# Patient Record
Sex: FEMALE
[De-identification: names, ages, dates, MRNs, and addresses within clinical notes are randomized per-mention and may not be internally consistent; named-entity substitution may affect disease eponyms.]

---

## 2022-02-15 PROBLEM — Z00.00 ENCOUNTER FOR PREVENTIVE HEALTH EXAMINATION: Status: ACTIVE | Noted: 2022-02-15

## 2022-03-29 ENCOUNTER — APPOINTMENT (OUTPATIENT)
Dept: NEUROLOGY | Facility: AMBULATORY SURGERY CENTER | Age: 22
End: 2022-03-29
Payer: COMMERCIAL

## 2022-03-29 ENCOUNTER — TRANSCRIPTION ENCOUNTER (OUTPATIENT)
Age: 22
End: 2022-03-29

## 2022-03-29 VITALS
HEIGHT: 61 IN | OXYGEN SATURATION: 100 % | HEART RATE: 75 BPM | DIASTOLIC BLOOD PRESSURE: 64 MMHG | WEIGHT: 98.01 LBS | SYSTOLIC BLOOD PRESSURE: 95 MMHG | BODY MASS INDEX: 18.51 KG/M2 | TEMPERATURE: 97.5 F

## 2022-03-29 DIAGNOSIS — Z78.9 OTHER SPECIFIED HEALTH STATUS: ICD-10-CM

## 2022-03-29 PROCEDURE — 99214 OFFICE O/P EST MOD 30 MIN: CPT

## 2022-03-29 RX ORDER — FEXOFENADINE HCL 60 MG
60 CAPSULE ORAL
Refills: 0 | Status: ACTIVE | COMMUNITY

## 2022-03-29 RX ORDER — LEVOTHYROXINE SODIUM 25 UG/1
25 TABLET ORAL DAILY
Refills: 0 | Status: ACTIVE | COMMUNITY

## 2022-03-29 RX ORDER — FAMOTIDINE 10 MG/1
10 TABLET, FILM COATED ORAL TWICE DAILY
Refills: 0 | Status: ACTIVE | COMMUNITY

## 2022-03-29 NOTE — PHYSICAL EXAM
[FreeTextEntry1] : General: this is a pleasant patient in no acute distress\par \par HEENT conjunctiva are normal, no tenderness in head\par \par CV: normal pulses, regular rate and rhythm, no peripheral edema noted\par \par Lungs: breathing is non-labored\par \par abd: soft and non-distended\par \par MSK:\par SLR: \par GRACIE:\par range of motion: full\par trigger points in traps\par tinnels: \par spurling:\par Occipital nerve tenderness: denies b/l\par \par Mental status:\par Alert and oriented to person, place and time, normal speech and comprehension\par \par Cranial Nerves:\par extra-occular movements in tact without nystagmus, normal saccades and smooth pursuit, Face symmetric and facial strength symmetric, facial sensation symmetric, \par \par Motor: normal bulk and tone throughout. no abnormal movements.  Full 5/5 strength uppers and lower extremities proximally and distally\par \par Sensory: in tact and symmetric to vibration, light tough, temperature\par \par Cerebellar: normal finger-nose-finger bilaterally\par \par Reflexes: 2+ in the upper and lower extremities and symmetric.  toes are bilaterally downgoing.\par \par Gait: stable, able to tip toe heel and tandem\par \par Stances:\par Romberg: normal\par \par \par

## 2022-03-29 NOTE — CONSULT LETTER
[Dear  ___] : Dear  [unfilled], [Courtesy Letter:] : I had the pleasure of seeing your patient, [unfilled], in my office today. [Please see my note below.] : Please see my note below. [Sincerely,] : Sincerely, [FreeTextEntry3] : Hong Partida

## 2022-03-29 NOTE — HISTORY OF PRESENT ILLNESS
[FreeTextEntry1] : Former [facility] patient here for f/u.  Last visit at [facility] was 2/15/2022 via video\par \par AVELINA AMBRIZ is a 21 year who presents with PTS, possible mild IIH, concussion, chronic migraine\par \par last visit refilled diamox 125mg TID, proranolol 5mg BID, florinef 0.05mg daily.  at that time was having more allergic issues\par \par after that she saw the allergist, allergic to many things, now on low histamine diet.  has lost 10lbs.  significantly less migraines since then. also on antihistamines.  apparently histamine still high despite diet change.\par \par was feeling very sleepy to weaned off diamox for the last week and doesn't feel worse.  still feels more fatigued.\par \par

## 2022-06-16 ENCOUNTER — APPOINTMENT (OUTPATIENT)
Dept: NEUROLOGY | Facility: AMBULATORY SURGERY CENTER | Age: 22
End: 2022-06-16
Payer: COMMERCIAL

## 2022-06-16 VITALS
SYSTOLIC BLOOD PRESSURE: 93 MMHG | TEMPERATURE: 98.1 F | DIASTOLIC BLOOD PRESSURE: 61 MMHG | HEIGHT: 61 IN | WEIGHT: 105 LBS | BODY MASS INDEX: 19.83 KG/M2

## 2022-06-16 VITALS — DIASTOLIC BLOOD PRESSURE: 65 MMHG | SYSTOLIC BLOOD PRESSURE: 91 MMHG | HEART RATE: 86 BPM | OXYGEN SATURATION: 100 %

## 2022-06-16 VITALS — HEART RATE: 73 BPM | OXYGEN SATURATION: 100 % | DIASTOLIC BLOOD PRESSURE: 64 MMHG | SYSTOLIC BLOOD PRESSURE: 96 MMHG

## 2022-06-16 DIAGNOSIS — G43.719 CHRONIC MIGRAINE W/OUT AURA, INTRACTABLE, W/OUT STATUS MIGRAINOSUS: ICD-10-CM

## 2022-06-16 DIAGNOSIS — G93.2 BENIGN INTRACRANIAL HYPERTENSION: ICD-10-CM

## 2022-06-16 PROCEDURE — 99214 OFFICE O/P EST MOD 30 MIN: CPT

## 2022-06-16 RX ORDER — FLUDROCORTISONE ACETATE 0.1 MG/1
0.1 TABLET ORAL DAILY
Qty: 15 | Refills: 3 | Status: DISCONTINUED | COMMUNITY
Start: 2022-03-29 | End: 2022-06-16

## 2022-06-16 RX ORDER — FLUDROCORTISONE ACETATE 0.1 MG/1
0.1 TABLET ORAL
Refills: 0 | Status: DISCONTINUED | COMMUNITY
End: 2022-06-16

## 2022-06-16 RX ORDER — EPINEPHRINE 0.3 MG/.3ML
0.3 INJECTION INTRAMUSCULAR
Qty: 2 | Refills: 0 | Status: ACTIVE | COMMUNITY
Start: 2022-03-05

## 2022-06-16 RX ORDER — PROPRANOLOL HYDROCHLORIDE 10 MG/1
10 TABLET ORAL TWICE DAILY
Refills: 0 | Status: DISCONTINUED | COMMUNITY
End: 2022-06-16

## 2022-06-16 RX ORDER — PROPRANOLOL HYDROCHLORIDE 10 MG/1
10 TABLET ORAL TWICE DAILY
Qty: 30 | Refills: 3 | Status: DISCONTINUED | COMMUNITY
Start: 2022-03-29 | End: 2022-06-16

## 2022-06-16 RX ORDER — AZELASTINE HYDROCHLORIDE 137 UG/1
0.1 SPRAY, METERED NASAL
Qty: 30 | Refills: 0 | Status: ACTIVE | COMMUNITY
Start: 2022-04-26

## 2022-06-16 NOTE — PHYSICAL EXAM
[FreeTextEntry1] : General: this is a pleasant patient in no acute distress\par \par HEENT conjunctiva are normal, no tenderness in head\par \par CV: normal pulses, regular rate and rhythm, no peripheral edema noted\par \par Lungs: breathing is non-labored\par \par abd: soft and non-distended\par \par MSK:\par SLR: \par GRACIE:\par range of motion: full\par trigger points in traps\par tinnels: \par spurling:\par Occipital nerve tenderness: denies b/l\par \par Mental status:\par Alert and oriented to person, place and time, normal speech and comprehension\par \par Cranial Nerves:\par extra-occular movements in tact without nystagmus, normal saccades and smooth pursuit, Face symmetric and facial strength symmetric, facial sensation symmetric, NPC 10cm\par \par Motor: normal bulk and tone throughout. no abnormal movements.  Full 5/5 strength uppers and lower extremities proximally and distally\par \par Sensory: in tact and symmetric to vibration, light tough, temperature\par \par Cerebellar: normal finger-nose-finger bilaterally\par \par Gait: stable, able to tip toe heel and tandem\par \par Stances:\par Romberg: normal\par \par \par

## 2022-06-16 NOTE — HISTORY OF PRESENT ILLNESS
[FreeTextEntry1] : AVELINA AMBRIZ is a 22 year who returns to follow up for POTS, possible IIH, concussion, chronic migraine\par \par last visit was 3/29/2022\par \par she was doing well off diamox and we planned to stop propranlol then fludrocortisone \par \par since last visit she did stop those medications\par \par started a digestive enzyme and also on several antihistamines. no migraines. completed PT and does do home exercises.  does 5-8lb weight training 5 times weekly and also mixes in some exercise bike\par \par still feels like there is decreased stamina and brain fog.  not specific to certain activities.\par \par had COVID end of May and brain did feel clear but had fevers.\par \par \par

## 2022-12-05 ENCOUNTER — APPOINTMENT (OUTPATIENT)
Age: 22
End: 2022-12-05

## 2022-12-05 VITALS
OXYGEN SATURATION: 99 % | HEART RATE: 72 BPM | HEIGHT: 61 IN | TEMPERATURE: 98.2 F | SYSTOLIC BLOOD PRESSURE: 105 MMHG | BODY MASS INDEX: 20.96 KG/M2 | DIASTOLIC BLOOD PRESSURE: 69 MMHG | WEIGHT: 111 LBS

## 2022-12-05 PROCEDURE — 99214 OFFICE O/P EST MOD 30 MIN: CPT

## 2022-12-05 RX ORDER — ONDANSETRON 4 MG/1
4 TABLET, ORALLY DISINTEGRATING ORAL
Qty: 10 | Refills: 0 | Status: DISCONTINUED | COMMUNITY
Start: 2022-03-05 | End: 2022-12-05

## 2022-12-05 RX ORDER — DAPSONE 50 MG/G
5 GEL TOPICAL
Qty: 60 | Refills: 0 | Status: DISCONTINUED | COMMUNITY
Start: 2022-01-10 | End: 2022-12-05

## 2022-12-05 RX ORDER — ADAPALENE 1 MG/G
0.1 GEL TOPICAL
Qty: 45 | Refills: 0 | Status: DISCONTINUED | COMMUNITY
Start: 2022-06-14 | End: 2022-12-05

## 2022-12-05 NOTE — ASSESSMENT
[FreeTextEntry1] : we had an extensive discussion about my experience with combination of concussion and COVID and MCAS, several questions answered

## 2022-12-05 NOTE — HISTORY OF PRESENT ILLNESS
[FreeTextEntry1] : AVELINA AMBRIZ is a 22 year who returns to follow up for concussion\par \par last visit was 6/16/2022 when she was doing quite well other than persistent fatigue\par \par since last visit had a great early summer.  had COVID in June.  then had tons of allergies with the fall like she's never had. foods, environment etc. allergist says it is MCAS.\par \par did have one really bad week of neck pain.  did streches and exercises until it was better.  tried voltaren that didn't help. \par \par now weeks are up and down.\par \par

## 2023-03-21 ENCOUNTER — TRANSCRIPTION ENCOUNTER (OUTPATIENT)
Age: 23
End: 2023-03-21

## 2023-03-27 ENCOUNTER — TRANSCRIPTION ENCOUNTER (OUTPATIENT)
Age: 23
End: 2023-03-27

## 2023-03-27 ENCOUNTER — NON-APPOINTMENT (OUTPATIENT)
Age: 23
End: 2023-03-27

## 2023-04-26 ENCOUNTER — APPOINTMENT (OUTPATIENT)
Age: 23
End: 2023-04-26
Payer: COMMERCIAL

## 2023-04-26 VITALS
OXYGEN SATURATION: 100 % | WEIGHT: 112 LBS | SYSTOLIC BLOOD PRESSURE: 108 MMHG | HEART RATE: 85 BPM | BODY MASS INDEX: 21.14 KG/M2 | DIASTOLIC BLOOD PRESSURE: 68 MMHG | HEIGHT: 61 IN | TEMPERATURE: 98.3 F

## 2023-04-26 PROCEDURE — 99214 OFFICE O/P EST MOD 30 MIN: CPT

## 2023-04-26 RX ORDER — SUMATRIPTAN 25 MG/1
25 TABLET, FILM COATED ORAL
Qty: 9 | Refills: 0 | Status: DISCONTINUED | COMMUNITY
Start: 2022-01-04 | End: 2023-04-26

## 2023-04-26 NOTE — CONSULT LETTER
[Dear  ___] : Dear  [unfilled], [Courtesy Letter:] : I had the pleasure of seeing your patient, [unfilled], in my office today. [Please see my note below.] : Please see my note below. [Consult Closing:] : Thank you very much for allowing me to participate in the care of this patient.  If you have any questions, please do not hesitate to contact me. [Sincerely,] : Sincerely, [FreeTextEntry3] : Hong Partida MD

## 2023-04-26 NOTE — HISTORY OF PRESENT ILLNESS
[FreeTextEntry1] : AVELINA AMBRIZ is a 23 year who returns to follow up for mTBI/concussion\par \par last visit was 12/6/2022 when I provided counseling and medications were not indicated\par \par since last visit was able to go skiing with family which she hasn't done in 5 years.  over passover she again started having a lot of allergic reactions, strong reactions to smells, was more agitated. \par \par her allergist retired, now seeing Dr Galvan at [place]\par \par has some tachycardia in mornings but not during the day.  not taking either fludrocortisone or propranolol\par \par no recent migraines. mild headaches with sleep deprivation.\par \par still feels foggy and fatigue and brain fog.\par \par needs a lot of sleep 12-9:30, doesn't always feel rested.  did previously see sleep at [facility] and sleep psychologist there but hasn't changed much\par \par \par

## 2023-08-01 ENCOUNTER — APPOINTMENT (OUTPATIENT)
Dept: PULMONOLOGY | Facility: CLINIC | Age: 23
End: 2023-08-01
Payer: COMMERCIAL

## 2023-08-01 VITALS
OXYGEN SATURATION: 99 % | BODY MASS INDEX: 20.96 KG/M2 | HEIGHT: 61 IN | HEART RATE: 75 BPM | SYSTOLIC BLOOD PRESSURE: 88 MMHG | TEMPERATURE: 98 F | DIASTOLIC BLOOD PRESSURE: 55 MMHG | WEIGHT: 111 LBS

## 2023-08-01 DIAGNOSIS — G47.11 IDIOPATHIC HYPERSOMNIA WITH LONG SLEEP TIME: ICD-10-CM

## 2023-08-01 PROCEDURE — 99203 OFFICE O/P NEW LOW 30 MIN: CPT

## 2023-08-02 NOTE — HISTORY OF PRESENT ILLNESS
[Awakes Unrefreshed] : awakening unrefreshed [Awakes with Headache] : headache upon awakening [Daytime Somnolence] : daytime somnolence [Hypersomnolence] : hypersomnolence [FreeTextEntry1] : Patient is a 23 year old F, seasonal and food allergies (followed by Dr. Galvan at [place]), seizures (childhood x 1), referred by neurologist Dr. Partida for further evaluation of hypersomnolence s/p concussion in 2018. Notes fatigue, brain fog, mild headaches, and POTs since she had concussion in 2018. While in school in 2018, she fainted and hit her head, states she lost consciousness briefly, had mild confusion directly after. Had MRI done after concussion but told it would improve slowly, otherwise no interventions at the time. Currently sleeping from 12pm-9:30 AM-11AM, does not feel well-rested in the morning. Notes fatigue throughout the day, naps about 30% of days for at least an hour. Every few months has periods lasting for a few weeks where she sleeps all day. No sleep disorders in the family. Before her concussion had some delayed sleep phase insomnia but no significant fatigue during the day. Has some feeling of being "out of her body" while she's falling asleep but no hallucinations, no sleep paralysis. Saw sleep specialist at [facility], had PSG which showed mild sleep apnea, recommended CPAP which patient did not use. No insomnia, no restless legs, snoring, witnessed apneas, dry mouth. Also follows with allergist at [place] for allergies to food (states symptom mostly acne and worsening fatigue). States had full allergy workup, is on a strict antihistamine diet with allergist. Has seen sleep psychologist who recommended sleep hygiene techniques like sunlight in the AM, only mild improvement. Fatigue during the day has greatly inhibited her quality of life and made it difficult for her to finish her schooling and participate fully in her normal activities.  [Snoring] : no snoring [Witnessed Apneas] : no witnessed sleep apnea [Frequent Nocturnal Awakening] : no nocturnal awakening [Unintentional Sleep while Active] : no unintentional sleep while active [Unintentional Sleep While Inactive] : no unintentional sleep while inactive [Awakening With Dry Mouth] : no dry mouth upon awakening [Recent  Weight Gain] : no recent weight gain [DIS] : no DIS [DMS] : no DMS [Unusual Sleep Behavior] : no unusual sleep behavior [Cataplexy] : no cataplexy [Sleep Paralysis] : no sleep paralysis [Hypnagogic Hallucinations] : no hallucinations when falling asleep [Hypnopompic Hallucinations] : no hallucinations when awakening [Lower Extremity Discomfort] : no lower extremity discomfort in evening or at bedtime [ESS] : 12

## 2023-08-02 NOTE — CONSULT LETTER
[Dear  ___] : Dear  [unfilled], [Consult Letter:] : I had the pleasure of evaluating your patient, [unfilled]. [Please see my note below.] : Please see my note below. [Consult Closing:] : Thank you very much for allowing me to participate in the care of this patient.  If you have any questions, please do not hesitate to contact me. [Sincerely,] : Sincerely, [FreeTextEntry3] : Anitha Pedro MD  [employer] at [facility] Pulmonary, Critical Care, and Sleep Medicine

## 2023-08-02 NOTE — ASSESSMENT
[FreeTextEntry1] : Reviewed Neurology notes PSG 2018: AHI 6.7; PLMS observed (index 11.5)  Patient is a 23-year-old F, seasonal and food allergies, seizures (childhood x 1), POTs disease, Hashimoto's (on Synthroid), Asthma (well-controlled), referred by neurologist Dr. Partida for further evaluation of hypersomnolence s/p concussion in 2018. Had PSG done in 2018 which showed mild sleep apnea with an AHI of 6.7. Her degree of fatigue is out of proportion to the diagnosis of mild sleep apnea and does not need to be treated. She does not have clinical evidence of RLS or PLMs. Her hypersomnolence is significant and interfering with her quality of life. Discussed that her presentation may be consistent with idiopathic hypersomnolence for which we would need a PSG w/ MSLT to diagnose. Potential therapeutic modalities including stimulants discussed today. Will discuss treatment options in much greater detail in the future if/when we make a formal diagnosis.

## 2023-08-02 NOTE — PHYSICAL EXAM
[General Appearance - Well Developed] : well developed [Normal Appearance] : normal appearance [Well Groomed] : well groomed [General Appearance - Well Nourished] : well nourished [No Deformities] : no deformities [General Appearance - In No Acute Distress] : no acute distress [Normal Conjunctiva] : the conjunctiva exhibited no abnormalities [Neck Appearance] : the appearance of the neck was normal [] : no respiratory distress [Exaggerated Use Of Accessory Muscles For Inspiration] : no accessory muscle use [Abnormal Walk] : normal gait [Oriented To Time, Place, And Person] : oriented to person, place, and time [Impaired Insight] : insight and judgment were intact

## 2023-08-03 ENCOUNTER — TRANSCRIPTION ENCOUNTER (OUTPATIENT)
Age: 23
End: 2023-08-03

## 2023-08-03 DIAGNOSIS — J45.20 MILD INTERMITTENT ASTHMA, UNCOMPLICATED: ICD-10-CM

## 2023-08-04 ENCOUNTER — TRANSCRIPTION ENCOUNTER (OUTPATIENT)
Age: 23
End: 2023-08-04

## 2023-08-21 ENCOUNTER — TRANSCRIPTION ENCOUNTER (OUTPATIENT)
Age: 23
End: 2023-08-21

## 2023-08-28 ENCOUNTER — TRANSCRIPTION ENCOUNTER (OUTPATIENT)
Age: 23
End: 2023-08-28

## 2023-08-29 ENCOUNTER — TRANSCRIPTION ENCOUNTER (OUTPATIENT)
Age: 23
End: 2023-08-29

## 2023-08-31 ENCOUNTER — TRANSCRIPTION ENCOUNTER (OUTPATIENT)
Age: 23
End: 2023-08-31

## 2023-09-07 ENCOUNTER — TRANSCRIPTION ENCOUNTER (OUTPATIENT)
Age: 23
End: 2023-09-07

## 2023-09-08 ENCOUNTER — TRANSCRIPTION ENCOUNTER (OUTPATIENT)
Age: 23
End: 2023-09-08

## 2023-09-08 RX ORDER — LEVALBUTEROL TARTRATE 45 UG/1
45 AEROSOL, METERED ORAL EVERY 4 HOURS
Qty: 1 | Refills: 3 | Status: ACTIVE | COMMUNITY
Start: 2023-08-03 | End: 1900-01-01

## 2023-09-11 ENCOUNTER — TRANSCRIPTION ENCOUNTER (OUTPATIENT)
Age: 23
End: 2023-09-11

## 2023-10-16 ENCOUNTER — TRANSCRIPTION ENCOUNTER (OUTPATIENT)
Age: 23
End: 2023-10-16

## 2023-10-17 ENCOUNTER — TRANSCRIPTION ENCOUNTER (OUTPATIENT)
Age: 23
End: 2023-10-17

## 2023-12-13 ENCOUNTER — APPOINTMENT (OUTPATIENT)
Dept: PULMONOLOGY | Facility: CLINIC | Age: 23
End: 2023-12-13
Payer: COMMERCIAL

## 2023-12-13 VITALS
HEIGHT: 61 IN | DIASTOLIC BLOOD PRESSURE: 69 MMHG | HEART RATE: 75 BPM | WEIGHT: 105 LBS | TEMPERATURE: 98.1 F | SYSTOLIC BLOOD PRESSURE: 103 MMHG | OXYGEN SATURATION: 100 % | BODY MASS INDEX: 19.83 KG/M2

## 2023-12-13 DIAGNOSIS — G47.19 OTHER HYPERSOMNIA: ICD-10-CM

## 2023-12-13 PROCEDURE — 99214 OFFICE O/P EST MOD 30 MIN: CPT

## 2023-12-17 PROBLEM — G47.19 EXCESSIVE DAYTIME SLEEPINESS: Status: ACTIVE | Noted: 2023-04-26

## 2023-12-17 NOTE — HISTORY OF PRESENT ILLNESS
[FreeTextEntry1] : Patient is a 23 year old F, seasonal and food allergies (followed by Dr. Galvan at [place]), seizures (childhood x 1), referred by neurologist Dr. Partida for further evaluation of hypersomnolence s/p concussion in 2018. Notes fatigue, brain fog, mild headaches, and POTs since she had concussion in 2018. While in school in 2018, she fainted and hit her head, states she lost consciousness briefly, had mild confusion directly after. Had MRI done after concussion but told it would improve slowly, otherwise no interventions at the time. Currently sleeping from 12pm-9:30 AM-11AM, does not feel well-rested in the morning. Notes fatigue throughout the day, naps about 30% of days for at least an hour. Every few months has periods lasting for a few weeks where she sleeps all day. No sleep disorders in the family. Before her concussion had some delayed sleep phase insomnia but no significant fatigue during the day. Has some feeling of being "out of her body" while she's falling asleep but no hallucinations, no sleep paralysis. Saw sleep specialist at [facility], had PSG which showed mild sleep apnea, recommended CPAP which patient did not use. No insomnia, no restless legs, snoring, witnessed apneas, dry mouth. Also follows with allergist at [place] for allergies to food (states symptom mostly acne and worsening fatigue). States had full allergy workup, is on a strict antihistamine diet with allergist. Has seen sleep psychologist who recommended sleep hygiene techniques like sunlight in the AM, only mild improvement. Fatigue during the day has greatly inhibited her quality of life and made it difficult for her to finish her schooling and participate fully in her normal activities  12/13/2023 PSG w/ MSLT denied by insurance. Has been using CPAP x 2 months. Does not notice a significant difference in symptoms and is still hypersomnolent.   [ESS] : 20

## 2023-12-17 NOTE — CONSULT LETTER
[Dear  ___] : Dear  [unfilled], [Courtesy Letter:] : I had the pleasure of seeing your patient, [unfilled], in my office today. [Please see my note below.] : Please see my note below. [Consult Closing:] : Thank you very much for allowing me to participate in the care of this patient.  If you have any questions, please do not hesitate to contact me. [Sincerely,] : Sincerely, [FreeTextEntry3] : Anitha Pedro MD  [employer] at [facility] Pulmonary, Critical Care, and Sleep Medicine [DrDavid  ___] : Dr. BUCKLEY

## 2023-12-17 NOTE — ASSESSMENT
[FreeTextEntry1] : REVIEWED [employer] 10/2023 to 12/2023: Average use of 6 hours 13 minutes, 82 % adherence of >4 hours per night of use, therapeutic AHI of 2.6, 95th percentile pressure 3.8, and minimal leak  Patient is a 23-year-old F, seasonal and food allergies, seizures (childhood x 1), POTs disease, Hashimoto's (on Synthroid), Asthma (well-controlled), referred by neurologist Dr. Partida for further evaluation of hypersomnolence s/p concussion in 2018. Had PSG done in 2018 which showed mild sleep apnea with an AHI of 6.7. Her degree of fatigue is out of proportion to the diagnosis of mild sleep apnea and does not need to be treated. She does not have clinical evidence of RLS or PLMs. Her hypersomnolence is significant and interfering with her quality of life. Discussed that her presentation may be consistent with idiopathic hypersomnolence for which we would need a PSG w/ MSLT to diagnose. Initial insurance denial due to presence of mild HARSH. She has not been on PAP for over 2 months for mild HARSH and does not perceive a significant improvement in her hypersomnolence. Suspicion for IH is still very high. Will attempt PSG/MSLT approval again.

## 2024-01-13 ENCOUNTER — APPOINTMENT (OUTPATIENT)
Dept: SLEEP CENTER | Facility: HOSPITAL | Age: 24
End: 2024-01-13

## 2024-01-14 ENCOUNTER — APPOINTMENT (OUTPATIENT)
Dept: SLEEP CENTER | Facility: HOSPITAL | Age: 24
End: 2024-01-14

## 2024-04-01 ENCOUNTER — APPOINTMENT (OUTPATIENT)
Dept: NEUROLOGY | Facility: CLINIC | Age: 24
End: 2024-04-01
Payer: COMMERCIAL

## 2024-04-01 ENCOUNTER — NON-APPOINTMENT (OUTPATIENT)
Age: 24
End: 2024-04-01

## 2024-04-01 VITALS
DIASTOLIC BLOOD PRESSURE: 70 MMHG | TEMPERATURE: 98.1 F | HEART RATE: 78 BPM | OXYGEN SATURATION: 99 % | RESPIRATION RATE: 17 BRPM | WEIGHT: 110 LBS | SYSTOLIC BLOOD PRESSURE: 106 MMHG

## 2024-04-01 DIAGNOSIS — D89.40 MAST CELL ACTIVATION, UNSPECIFIED: ICD-10-CM

## 2024-04-01 DIAGNOSIS — G43.109 MIGRAINE WITH AURA, NOT INTRACTABLE, W/OUT STATUS MIGRAINOSUS: ICD-10-CM

## 2024-04-01 DIAGNOSIS — Z87.820 PERSONAL HISTORY OF TRAUMATIC BRAIN INJURY: ICD-10-CM

## 2024-04-01 DIAGNOSIS — G90.A POSTURAL ORTHOSTATIC TACHYCARDIA SYNDROME [POTS]: ICD-10-CM

## 2024-04-01 PROCEDURE — 99214 OFFICE O/P EST MOD 30 MIN: CPT

## 2024-04-01 NOTE — PHYSICAL EXAM
[FreeTextEntry1] : General: this is a pleasant patient in no acute distress    HEENT conjunctiva are normal, no tenderness in head    CV: normal pulses, regular rate and rhythm, no peripheral edema noted    Lungs: breathing is non-labored    abd: soft and non-distended    MSK:  SLR:  GRACIE:  range of motion: full  trigger points in traps  tinnels:  spurling:  Occipital nerve tenderness: denies b/l    Mental status:  Alert and oriented to person, place and time, normal speech and comprehension    Cranial Nerves:  extra-occular movements in tact without nystagmus, normal saccades and smooth pursuit, Face symmetric and facial strength symmetric, facial sensation symmetric,    Motor: normal bulk and tone throughout. no abnormal movements. Full 5/5 strength uppers and lower extremities proximally and distally    Sensory: in tact and symmetric to vibration, light tough, temperature    Cerebellar: normal finger-nose-finger bilaterally    Reflexes: 2+ in the upper and lower extremities and symmetric. toes are bilaterally downgoing.    Gait: stable, able to tip toe heel and tandem    Stances:  Romberg: normal

## 2024-04-01 NOTE — HISTORY OF PRESENT ILLNESS
[FreeTextEntry1] : AVELINA AMBRIZ is a 24 year who returns to follow up for mTBI/concussion  last visit was 4/26/2023  since last visit saw Dr Pedro.  Feels benefit from CPAP. able to wake up at 9am which she hasn't done in years.  she did also go on a ski trip that went great, took a successful flight. now doing bike rides in her neighborhood.  no new meds, no new foods  had a few days of feeling weak/shaky/fatigue/mild nausea/imbalance.  no photophobia. remembers feeling "off" on day but then it continued.  she isn't sure exactly how quickly it improved but it was over days. didn't check vitals.   continues with functional neurologist and acupuncturist

## 2024-12-31 ENCOUNTER — TRANSCRIPTION ENCOUNTER (OUTPATIENT)
Age: 24
End: 2024-12-31

## 2025-03-03 ENCOUNTER — APPOINTMENT (OUTPATIENT)
Dept: NEUROLOGY | Age: 25
End: 2025-03-03

## 2025-08-25 ENCOUNTER — APPOINTMENT (OUTPATIENT)
Dept: ULTRASOUND IMAGING | Facility: HOSPITAL | Age: 25
End: 2025-08-25
Payer: COMMERCIAL

## 2025-08-25 ENCOUNTER — OUTPATIENT (OUTPATIENT)
Dept: OUTPATIENT SERVICES | Facility: HOSPITAL | Age: 25
LOS: 1 days | End: 2025-08-25

## 2025-08-25 PROCEDURE — 76700 US EXAM ABDOM COMPLETE: CPT | Mod: 26

## 2025-08-25 PROCEDURE — 76856 US EXAM PELVIC COMPLETE: CPT | Mod: 26
